# Patient Record
Sex: FEMALE | Race: WHITE
[De-identification: names, ages, dates, MRNs, and addresses within clinical notes are randomized per-mention and may not be internally consistent; named-entity substitution may affect disease eponyms.]

---

## 2019-03-03 ENCOUNTER — HOSPITAL ENCOUNTER (EMERGENCY)
Dept: HOSPITAL 53 - M ED | Age: 24
LOS: 1 days | Discharge: HOME | End: 2019-03-04
Payer: COMMERCIAL

## 2019-03-03 VITALS — DIASTOLIC BLOOD PRESSURE: 83 MMHG | SYSTOLIC BLOOD PRESSURE: 140 MMHG

## 2019-03-03 VITALS — HEIGHT: 67 IN | BODY MASS INDEX: 23.59 KG/M2 | WEIGHT: 150.31 LBS

## 2019-03-03 DIAGNOSIS — J06.9: Primary | ICD-10-CM

## 2019-03-03 DIAGNOSIS — F17.200: ICD-10-CM

## 2019-03-03 LAB
FLUAV RNA UPPER RESP QL NAA+PROBE: NEGATIVE
FLUBV RNA UPPER RESP QL NAA+PROBE: NEGATIVE

## 2019-03-03 PROCEDURE — 96372 THER/PROPH/DIAG INJ SC/IM: CPT

## 2019-03-03 PROCEDURE — 99284 EMERGENCY DEPT VISIT MOD MDM: CPT

## 2019-03-03 PROCEDURE — 87502 INFLUENZA DNA AMP PROBE: CPT

## 2019-03-03 PROCEDURE — 81025 URINE PREGNANCY TEST: CPT

## 2019-03-03 PROCEDURE — 87880 STREP A ASSAY W/OPTIC: CPT

## 2019-03-03 PROCEDURE — 71046 X-RAY EXAM CHEST 2 VIEWS: CPT

## 2019-03-04 NOTE — REP
Clinical:  Cough .

 

Comparison: None .

 

Technique:  PA and lateral.

 

Findings:

The mediastinum and cardiac silhouette are normal.  The lung fields are clear and

without acute consolidation, effusion, or pneumothorax.  The skeletal structures

are intact and normal.

 

Impression:

1.   No acute cardiopulmonary process.

 

 

Electronically Signed by

Stevan Chilel MD 03/04/2019 01:29 A

## 2021-04-13 NOTE — REP
INDICATION:

ANATOMY



COMPARISON:

None.



TECHNIQUE:

Transabdominal obstetrical ultrasound with color Doppler evaluation.



FINDINGS:

Examination demonstrates a single live intrauterine pregnancy in breech presentation.

Fetal motion is identified by technologist.  Placenta is noted posterior and  grade 1

without evidence for placenta previa or abruption.  Amniotic fluid volume is normal.

Cervix measures 3.1 cm in length and appears closed..



Gestational age by LMP 21 weeks 1 day with ANTONIO 08/22/2021.

Gestational age by current measurements 21 weeks 5 days with ANTONIO 08/18/2021.



FHR equals 153 beats per minute.



BPD:      4.7 cm at        20 weeks 1 day

HC:         19.1 cm at         21 weeks 2 days

AC:         17.1 cm at      22 weeks 0 days

FL:          3.8 cm at      22 weeks 0 days

HL:          3.7 cm at      22 weeks 5 days

HC/AC: 1.12

Estimated fetal weight 461 grams (83rdpercentile).



Anatomical assessment demonstrates normal structures including cranium, choroid

plexus, cavum, facial features, lungs, ventricular outflow tracts, diaphragm, stomach,

cord insertion/three-vessel cord, bladder, spine, and extremities.



Echogenic focus within the left cardiac ventricle consistent with prominent chordae

tendineae.

Mild renal pelviectasis measuring up to 10 mm in the right kidney.

Limited evaluation of the cerebellum and posterior fossa.



IMPRESSION:

Single live intrauterine pregnancy in breech presentation demonstrating appropriate

estimated fetal weight.

Anatomical limitations and findings as described above may warrant re-evaluation and

follow-up.





<Electronically signed by Stevan Chille > 04/13/21 0733

## 2021-05-07 NOTE — REP
INDICATION:

F/U ANATOMY.



COMPARISON:

Comparison study 2021..



TECHNIQUE:

Transabdominal obstetric sonography.



FINDINGS:

Scanning through the gravid uterus demonstrates a viable single intrauterine gestation

in cephalic fetal lie.  Fetal motion is observed and fetal heart rate is recorded at

143 beats per minute.  A posterior placenta is seen, grade .  One, without evidence of

placenta previa. Closed cervical length is measured at 3.4 cm transabdominally.  No

extrauterine abnormality is observed.  Amniotic fluid is subjectively normal.



Echogenic foci no longer visible in the left fetal left ventricle.  Bilateral fetal

renal pyelectasis is observed right more so than left.  The AP dimension of the renal

pelvis on the right is 11 mm and on the left is 5 mm.  Urinary bladder is not

distended.   urinary tract sonography is recommended.  Posterior fossa is

seen today and felt to be unremarkable..



Biometry chart:

BPD 5.9 cm, 24 weeks 1 day

Head circumference 23.0 cm, 25 weeks 0 days

Abdominal circumference 19.7 cm, 24 weeks 2 days

Femur length 4.6 cm, 25 weeks 2 days

Humeral length 4.2 cm, 25 weeks 2 days

HC AC ratio normal 1.17

Cephalic index normal 0.70

Estimated fetal weight 734 g, 1 lb 9 oz, 43rd percentile for 24 weeks 5 days



IMPRESSION:

Viable single intrauterine gestation at 24 weeks 6 days by today's composite

sonographic criteria.  ANTONIO by today's sonography 2021..  No complication

identified.



Expected gestational age estimate based on ANTONIO of 2021 is 24 weeks 5 days.

Appropriate interval growth.  Bilateral fetal renal pyelectasis.  Follow-up obstetric

scan and  urinary tract sonography recommended.





<Electronically signed by Juanito Strong > 21 [id number]

## 2021-06-15 NOTE — REP
INDICATION:

ANATOMY



COMPARISON:

2021



TECHNIQUE:

Transabdominal obstetrical ultrasound with color Doppler evaluation.



FINDINGS:

Examination demonstrates a single live intrauterine pregnancy in cephalic

presentation.  Fetal motion is identified by technologist.  Placenta is noted

posterior and  grade 1 without evidence for placenta previa or abruption.  Amniotic

fluid volume is normal.



Selected gestational age: 30 weeks 1 day with ANTONIO 2021.

Gestational age by current measurements 30 weeks 6 days with ANTONIO 2021.



FHR equals 150 beats per minute.



JAYANT: 10.2 cm (9.0-23.5)

Umbilical artery SD ratio: 2.95 (1.95-4.08)

Estimated fetal weight 1627 grams (58thpercentile).



Anatomical assessment demonstrates normal structures including cranium, choroid

plexus, cavum, cerebellum/posterior fossa, facial features, lungs, four-chamber

heart/ventricular outflow tracts, diaphragm, stomach, cord insertion/three-vessel

cord, bladder, spine, and extremities.



Bilateral renal hydronephrosis noted.



IMPRESSION:

Single live intrauterine pregnancy in cephalic presentation demonstrating appropriate

estimated fetal weight.

Renal hydronephrosis warrants  follow-up examination.





<Electronically signed by Stevan Chilel > 06/15/21 0700

## 2021-08-05 NOTE — IPNPDOC
Text Note


Date of Service


The patient was seen on 21.





NOTE


Triage Note





Amanda is a 26yo  with SIUP at 37w4d presenting to triage from OB office 

visit where she was noted to have an elevated bp. This is first elevated bp in 

her pregnancy- her baseline is 120's/60's. She has no complaints. Good fetal 

movement, no vaginal bleeding, no loss of fluid, no ctx. No HA/vision 

changes/RUQ pain. 





Vitals: all 7 bp's wnl, afebrile


Gen: WDWN, resting comfortably


Abdomen: soft, gravid, NTTP


Extremities: no edema of BLE





NST: reactive, +accels, -decels, mod yovanny


Hawkins: no ctx pattern





Labs:


urine prot:creat 0.2968


Plt 184, serum creat 0.52, AST 11, ALT 14





Assessment: 


Amanda is a 26yo  with SIUP at 37w4d with NO evidence of pre-eclampsia and 

does not yet meet criteria for GHTN since only an isolated episode of elevated 

bp in clinic which was not found on repeat here today. Urine prot:creat rounds 

up to 0.3, but in the absence of any other elevated bp's, cannot make a dx of 

pre-E. She has no sx of pre-E. Reassuring fetal assessment. 





Plan:


-Safe for discharge home


-Pt instructed to return in 2 days for repeat bp check


-Discussed return precautions at length, to include s/sx of pre-E


-Continue good hydration





Manju Posada MD





VS,Teri, I+O


VSTeri I+O


Laboratory Tests


21 09:35











Vital Signs








  Date Time  Temp Pulse Resp B/P (MAP) Pulse Ox O2 Delivery O2 Flow Rate FiO2


 


21 11:03 97.9 80 18 122/67 (85)    

















Manju Posada MD            Aug 5, 2021 11:49